# Patient Record
Sex: MALE | Race: BLACK OR AFRICAN AMERICAN | NOT HISPANIC OR LATINO | Employment: FULL TIME | URBAN - METROPOLITAN AREA
[De-identification: names, ages, dates, MRNs, and addresses within clinical notes are randomized per-mention and may not be internally consistent; named-entity substitution may affect disease eponyms.]

---

## 2018-04-06 ENCOUNTER — OFFICE VISIT (OUTPATIENT)
Dept: UROLOGY | Facility: CLINIC | Age: 33
End: 2018-04-06
Payer: COMMERCIAL

## 2018-04-06 VITALS — SYSTOLIC BLOOD PRESSURE: 135 MMHG | HEART RATE: 40 BPM | DIASTOLIC BLOOD PRESSURE: 78 MMHG

## 2018-04-06 DIAGNOSIS — R31.9 HEMATURIA, UNSPECIFIED TYPE: ICD-10-CM

## 2018-04-06 DIAGNOSIS — D66 HEMOPHILIA: ICD-10-CM

## 2018-04-06 DIAGNOSIS — R31.0 GROSS HEMATURIA: Primary | ICD-10-CM

## 2018-04-06 LAB
BILIRUB SERPL-MCNC: ABNORMAL MG/DL
BLOOD URINE, POC: ABNORMAL
COLOR, POC UA: ABNORMAL
GLUCOSE UR QL STRIP: ABNORMAL
KETONES UR QL STRIP: ABNORMAL
LEUKOCYTE ESTERASE URINE, POC: ABNORMAL
NITRITE, POC UA: ABNORMAL
PH, POC UA: 6
PROTEIN, POC: ABNORMAL
SPECIFIC GRAVITY, POC UA: 1.01
UROBILINOGEN, POC UA: 4

## 2018-04-06 PROCEDURE — 87086 URINE CULTURE/COLONY COUNT: CPT

## 2018-04-06 PROCEDURE — 99204 OFFICE O/P NEW MOD 45 MIN: CPT | Mod: 25,S$GLB,, | Performed by: UROLOGY

## 2018-04-06 PROCEDURE — 81002 URINALYSIS NONAUTO W/O SCOPE: CPT | Mod: S$GLB,,, | Performed by: UROLOGY

## 2018-04-06 PROCEDURE — 99999 PR PBB SHADOW E&M-NEW PATIENT-LVL III: CPT | Mod: PBBFAC,,, | Performed by: UROLOGY

## 2018-04-06 RX ORDER — ANTIHEMOPHILIC FACTOR (RECOMBINANT) 2000 (+/-)
KIT INTRAVENOUS
Refills: 1 | COMMUNITY
Start: 2018-02-15

## 2018-04-06 NOTE — PROGRESS NOTES
HPI:  Renny Collazo is a 32 y.o. year old male that  presents with No chief complaint on file.  .  This patient refers himself to the office with proximal weeklong history of intermittent gross hematuria not associated with flank pain nausea vomiting dysuria fevers or chills.  There is no history of kidney stone.    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    His PCP and hematologist are at [facility] however due to availability, he comes for urology evaluation here.  Patient states that he was treated for STD recently but he was told that test came back negative    There is no family history of kidney bladder or prostate cancer and the patient is never had urologic surgery    As patient is new to the [facility] system, there is nothing in the chart      History reviewed. No pertinent past medical history.  Social History     Social History    Marital status: Single     Spouse name: N/A    Number of children: N/A    Years of education: N/A     Occupational History    Not on file.     Social History Main Topics    Smoking status: Current Every Day Smoker    Smokeless tobacco: Never Used    Alcohol use Yes    Drug use: Yes     Types: Marijuana, Cocaine    Sexual activity: Yes     Birth control/ protection: Condom     Other Topics Concern    Not on file     Social History Narrative    No narrative on file     History reviewed. No pertinent surgical history.  History reviewed. No pertinent family history.        Review of Systems  The patient has no chest pains.  The patient has no shortness of breath  Patient wears        glasses.  All other review of systems are negative.      Physical Exam:  /78 (BP Location: Right arm, Patient Position: Sitting)   Pulse (!) 40   General appearance: alert, cooperative, no distress  Constitutional:Oriented to person, place, and time.appears well-developed and  well-nourished.   HEENT: Normocephalic, atraumatic, neck symmetrical, no nasal discharge   Eyes: conjunctivae/corneas clear, PERRL, EOM's intact  Lungs: clear to auscultation bilaterally, no dullness to percussion bilaterally  Heart: regular rate and rhythm without rub; no displacement of the PMI   Abdomen: soft, non-tender; bowel sounds normoactive; no organomegaly  :Penis/perineum without lesions, scrotum without rash/cysts, epididymis nontender bilaterally, urethral meatus in normal location normal size, no penile plaques palpated testes equal in size without masses.  Extremities: extremities symmetric; no clubbing, cyanosis, or edema  Integument: Skin color, texture, turgor normal; no rashes; hair distrubution normal  Neurologic: Alert and oriented X 3, normal strength, normal coordination and gait  Psychiatric: no pressured speech; normal affect; no evidence of impaired cognition     LABS:    Complete Blood Count  No results found for: RBC, HGB, HCT, MCV, MCH, MCHC, RDW, PLT, MPV, GRAN, LYMPH, MONO, EOS, BASO, GRAN, LYMPH, MONO, EOSINOPHIL, BASOPHIL, DIFFMETHOD    Comprehensive Metabolic Panel  No results found for: GLU, BUN, CREATININE, NA, K, CL, PROT, ALBUMIN, BILITOT, AST, ALKPHOS, CO2, ALT, ANIONGAP, EGFRNONAA, ESTGFRAFRICA    PSA  No results found for: PSA      Assessment:    ICD-10-CM ICD-9-CM    1. Gross hematuria R31.0 599.71 Urine culture      Basic metabolic panel      Cystoscopy      POCT URINE DIPSTICK WITHOUT MICROSCOPE   2. Hematuria, unspecified type R31.9 599.70 CT Urogram Abd Pelvis W WO      POCT URINE DIPSTICK WITHOUT MICROSCOPE   3. Hemophilia D66 286.0 POCT URINE DIPSTICK WITHOUT MICROSCOPE     The primary encounter diagnosis was Gross hematuria. Diagnoses of Hematuria, unspecified type and Hemophilia were also pertinent to this visit.      Plan: 1 and 2.  Gross hematuria.  Plan.Plan.  I discussed at length in detail with the patient the need to evaluate blood in the urine.  I discussed  that this is being done to rule out the presence of urothelial cancer.  I discussed the need for both imaging of the upper tracts and also cystoscopy.  Patient voices understanding and agrees.  We'll check baseline metabolic panel and proceed with CT urogram and subsequent cystoscopy.Patient's urine will be cultured and once results are available ,we will contact the patient if antibiotics are indicated.    #3.  Hemophilia.  Plan.  Continue self treatment with anti-hemolytic factor under supervision of his hematologist  Orders Placed This Encounter   Procedures    Cystoscopy    Urine culture    CT Urogram Abd Pelvis W WO    Basic metabolic panel    POCT URINE DIPSTICK WITHOUT MICROSCOPE           Leighton Robbins MD    PLEASE NOTE:  Please be advised that portions of this note were dictated using voice recognition software and may contain dictation related errors in spelling/grammar/appropriate pronouns/syntax or other errors that might have not been found and or corrected on text review.

## 2018-04-08 LAB — BACTERIA UR CULT: NO GROWTH

## 2018-04-10 ENCOUNTER — TELEPHONE (OUTPATIENT)
Dept: UROLOGY | Facility: CLINIC | Age: 33
End: 2018-04-10

## 2018-04-10 NOTE — TELEPHONE ENCOUNTER
----- Message from Leighton Robbins MD sent at 4/9/2018  3:55 PM CDT -----  Please contact patient and let patient know that recent urine culture was negative.

## 2018-04-18 ENCOUNTER — TELEPHONE (OUTPATIENT)
Dept: UROLOGY | Facility: CLINIC | Age: 33
End: 2018-04-18

## 2018-04-18 NOTE — TELEPHONE ENCOUNTER
Please send the patient a registered letter, with return receipt requested, concerning the patient's missed appointment for    evaluation of blood in urine                                                                              .  Please have this letter state that it is important that this evaluation be done to make sure that cancer  of the     urinary tract does not develop                                     .  Please have the patient contact the office if  the patient wants to reschedule this appointment.

## 2019-04-26 LAB
BILIRUB UR QL STRIP: NEGATIVE
CLARITY UR: CLEAR
COLOR UR: YELLOW
GLUCOSE UR QL STRIP: NEGATIVE
HGB UR QL STRIP: NEGATIVE
KETONES UR QL STRIP: ABNORMAL
LEUKOCYTE ESTERASE UR QL STRIP: NEGATIVE
NITRITE UR QL STRIP: NEGATIVE
PH UR STRIP: 8 [PH] (ref 5–8)
PROT UR QL STRIP: NEGATIVE
SP GR UR STRIP: 1.02 (ref 1–1.03)
URN SPEC COLLECT METH UR: ABNORMAL
UROBILINOGEN UR STRIP-ACNC: NEGATIVE EU/DL

## 2019-04-26 PROCEDURE — 81003 URINALYSIS AUTO W/O SCOPE: CPT

## 2019-04-26 PROCEDURE — 96366 THER/PROPH/DIAG IV INF ADDON: CPT

## 2019-04-26 PROCEDURE — 96365 THER/PROPH/DIAG IV INF INIT: CPT

## 2019-04-26 PROCEDURE — 99285 EMERGENCY DEPT VISIT HI MDM: CPT | Mod: 25

## 2019-04-27 ENCOUNTER — HOSPITAL ENCOUNTER (EMERGENCY)
Facility: OTHER | Age: 34
Discharge: HOME | End: 2019-04-27
Attending: EMERGENCY MEDICINE

## 2019-04-27 VITALS
RESPIRATION RATE: 18 BRPM | OXYGEN SATURATION: 98 % | WEIGHT: 149.94 LBS | HEART RATE: 52 BPM | HEIGHT: 68 IN | DIASTOLIC BLOOD PRESSURE: 91 MMHG | SYSTOLIC BLOOD PRESSURE: 162 MMHG | BODY MASS INDEX: 22.72 KG/M2 | TEMPERATURE: 98 F

## 2019-04-27 DIAGNOSIS — R10.13 EPIGASTRIC PAIN: Primary | ICD-10-CM

## 2019-04-27 LAB
ALBUMIN SERPL BCP-MCNC: 4.6 G/DL (ref 3.5–5.2)
ALP SERPL-CCNC: 102 U/L (ref 55–135)
ALT SERPL W/O P-5'-P-CCNC: 70 U/L (ref 10–44)
ANION GAP SERPL CALC-SCNC: 12 MMOL/L (ref 8–16)
AST SERPL-CCNC: 59 U/L (ref 10–40)
BASOPHILS # BLD AUTO: 0.01 K/UL (ref 0–0.2)
BASOPHILS NFR BLD: 0.1 % (ref 0–1.9)
BILIRUB SERPL-MCNC: 0.6 MG/DL (ref 0.1–1)
BUN SERPL-MCNC: 11 MG/DL (ref 6–20)
CALCIUM SERPL-MCNC: 10.6 MG/DL (ref 8.7–10.5)
CHLORIDE SERPL-SCNC: 101 MMOL/L (ref 95–110)
CO2 SERPL-SCNC: 24 MMOL/L (ref 23–29)
CREAT SERPL-MCNC: 0.9 MG/DL (ref 0.5–1.4)
DIFFERENTIAL METHOD: ABNORMAL
EOSINOPHIL # BLD AUTO: 0 K/UL (ref 0–0.5)
EOSINOPHIL NFR BLD: 0 % (ref 0–8)
ERYTHROCYTE [DISTWIDTH] IN BLOOD BY AUTOMATED COUNT: 13.5 % (ref 11.5–14.5)
EST. GFR  (AFRICAN AMERICAN): >60 ML/MIN/1.73 M^2
EST. GFR  (NON AFRICAN AMERICAN): >60 ML/MIN/1.73 M^2
GLUCOSE SERPL-MCNC: 131 MG/DL (ref 70–110)
HCT VFR BLD AUTO: 46.4 % (ref 40–54)
HGB BLD-MCNC: 16.3 G/DL (ref 14–18)
LIPASE SERPL-CCNC: 28 U/L (ref 4–60)
LYMPHOCYTES # BLD AUTO: 1.6 K/UL (ref 1–4.8)
LYMPHOCYTES NFR BLD: 15.4 % (ref 18–48)
MCH RBC QN AUTO: 32.4 PG (ref 27–31)
MCHC RBC AUTO-ENTMCNC: 35.1 G/DL (ref 32–36)
MCV RBC AUTO: 92 FL (ref 82–98)
MONOCYTES # BLD AUTO: 0.3 K/UL (ref 0.3–1)
MONOCYTES NFR BLD: 3.2 % (ref 4–15)
NEUTROPHILS # BLD AUTO: 8.5 K/UL (ref 1.8–7.7)
NEUTROPHILS NFR BLD: 81.1 % (ref 38–73)
PLATELET # BLD AUTO: 179 K/UL (ref 150–350)
PMV BLD AUTO: 9.2 FL (ref 9.2–12.9)
POTASSIUM SERPL-SCNC: 4.6 MMOL/L (ref 3.5–5.1)
PROT SERPL-MCNC: 8.9 G/DL (ref 6–8.4)
RBC # BLD AUTO: 5.03 M/UL (ref 4.6–6.2)
SODIUM SERPL-SCNC: 137 MMOL/L (ref 136–145)
WBC # BLD AUTO: 10.53 K/UL (ref 3.9–12.7)

## 2019-04-27 PROCEDURE — 63600175 PHARM REV CODE 636 W HCPCS: Performed by: EMERGENCY MEDICINE

## 2019-04-27 PROCEDURE — 36415 COLL VENOUS BLD VENIPUNCTURE: CPT

## 2019-04-27 PROCEDURE — 85025 COMPLETE CBC W/AUTO DIFF WBC: CPT

## 2019-04-27 PROCEDURE — 25000003 PHARM REV CODE 250: Performed by: EMERGENCY MEDICINE

## 2019-04-27 PROCEDURE — 83690 ASSAY OF LIPASE: CPT

## 2019-04-27 PROCEDURE — 25500020 PHARM REV CODE 255: Performed by: EMERGENCY MEDICINE

## 2019-04-27 PROCEDURE — 80053 COMPREHEN METABOLIC PANEL: CPT

## 2019-04-27 RX ORDER — DICYCLOMINE HYDROCHLORIDE 20 MG/1
20 TABLET ORAL 2 TIMES DAILY
Qty: 20 TABLET | Refills: 0 | Status: SHIPPED | OUTPATIENT
Start: 2019-04-27 | End: 2019-05-27

## 2019-04-27 RX ORDER — ONDANSETRON 4 MG/1
4 TABLET, ORALLY DISINTEGRATING ORAL
Status: COMPLETED | OUTPATIENT
Start: 2019-04-27 | End: 2019-04-27

## 2019-04-27 RX ORDER — OMEPRAZOLE 20 MG/1
20 CAPSULE, DELAYED RELEASE ORAL DAILY
Qty: 30 CAPSULE | Refills: 0 | Status: SHIPPED | OUTPATIENT
Start: 2019-04-27 | End: 2019-05-27

## 2019-04-27 RX ORDER — SODIUM CHLORIDE 9 MG/ML
1000 INJECTION, SOLUTION INTRAVENOUS
Status: COMPLETED | OUTPATIENT
Start: 2019-04-27 | End: 2019-04-27

## 2019-04-27 RX ORDER — ONDANSETRON 4 MG/1
4 TABLET, ORALLY DISINTEGRATING ORAL EVERY 8 HOURS PRN
Qty: 20 TABLET | Refills: 0 | Status: SHIPPED | OUTPATIENT
Start: 2019-04-27 | End: 2019-05-04

## 2019-04-27 RX ADMIN — PROMETHAZINE HYDROCHLORIDE 12.5 MG: 25 INJECTION INTRAMUSCULAR; INTRAVENOUS at 01:04

## 2019-04-27 RX ADMIN — SODIUM CHLORIDE 1000 ML: 0.9 INJECTION, SOLUTION INTRAVENOUS at 01:04

## 2019-04-27 RX ADMIN — LIDOCAINE HYDROCHLORIDE: 20 SOLUTION ORAL; TOPICAL at 01:04

## 2019-04-27 RX ADMIN — ONDANSETRON 4 MG: 4 TABLET, ORALLY DISINTEGRATING ORAL at 02:04

## 2019-04-27 RX ADMIN — IOHEXOL 75 ML: 350 INJECTION, SOLUTION INTRAVENOUS at 03:04

## 2019-04-27 NOTE — ED NOTES
Pt wanting to know how much longer. Told pt that there is one ahead of him and we have no rooms available, but as soon as we have a discharge or admit, that they will be second to come back

## 2019-04-27 NOTE — ED PROVIDER NOTES
"Encounter Date: 4/26/2019    SCRIBE #1 NOTE: I, Aurelio Calvert, am scribing for, and in the presence of, Dr. Cates .       History     Chief Complaint   Patient presents with    Flank Pain     L flank pain since 1800 hrs, radiating to the epigastric area w/ nausea     Time seen by provider: 1:15 AM    This is a 33 y.o. male who presents with complaint of constant, "sharp", epigastric, abdominal pain radiating to his left side of his back that began at 6 pm yesterday. Patient states the pain began while he was eating "candied jose and a sandwich". He currently rates his pain an "8" out of 10. He has not taken analgesics. He reports associated chills, hot flashes, and nausea. He admits to consuming small amount of alcohol yesterday. He denies fevers, headaches, dizziness, vomiting, diarrhea, bloody stools, or dysuria.       The history is provided by the patient (friend at bedside).     Review of patient's allergies indicates:   Allergen Reactions    Aspirin Other (See Comments)     Pt is a hemophiliac    Claritin [loratadine]      Past Medical History:   Diagnosis Date    Hemophilia A      History reviewed. No pertinent surgical history.  History reviewed. No pertinent family history.  Social History     Tobacco Use    Smoking status: Current Every Day Smoker    Smokeless tobacco: Never Used   Substance Use Topics    Alcohol use: Yes    Drug use: Yes     Types: Marijuana, Cocaine     Review of Systems   Constitutional: Positive for chills. Negative for fever.        Positive for hot flashes.   HENT: Negative for congestion, rhinorrhea and sore throat.    Respiratory: Negative for cough and shortness of breath.    Cardiovascular: Negative for chest pain.   Gastrointestinal: Positive for abdominal pain and nausea. Negative for blood in stool, diarrhea and vomiting.   Genitourinary: Negative for dysuria.   Musculoskeletal: Negative for back pain.   Skin: Negative for rash.   Neurological: Negative for " dizziness and headaches.   Psychiatric/Behavioral: Negative for confusion.       Physical Exam     Initial Vitals [04/26/19 2312]   BP Pulse Resp Temp SpO2   136/87 (!) 50 20 97.9 °F (36.6 °C) 100 %      MAP       --         Physical Exam    Nursing note and vitals reviewed.  Constitutional: He appears well-developed and well-nourished. No distress.   HENT:   Head: Normocephalic and atraumatic.   Mouth/Throat: Oropharynx is clear and moist.   Eyes: Conjunctivae and EOM are normal. Pupils are equal, round, and reactive to light.   Neck: Normal range of motion. Neck supple.   Cardiovascular: Regular rhythm, normal heart sounds and intact distal pulses. Bradycardia present.    Pulmonary/Chest: Breath sounds normal. No respiratory distress. He has no wheezes. He has no rhonchi. He has no rales.   Abdominal: Soft. He exhibits no distension. There is tenderness. There is guarding. There is no rebound.   Epigastric tenderness to palpation with voluntary guarding. No rebound. Non peritoneal.    Musculoskeletal: Normal range of motion. He exhibits no edema or tenderness.   No CVA tenderness.    Neurological: He is alert and oriented to person, place, and time. He has normal strength.   Skin: Skin is warm and dry.   Psychiatric: He has a normal mood and affect. His behavior is normal. Judgment and thought content normal.         ED Course   Procedures  Labs Reviewed   URINALYSIS, REFLEX TO URINE CULTURE - Abnormal; Notable for the following components:       Result Value    Ketones, UA 1+ (*)     All other components within normal limits    Narrative:     Preferred Collection Type->Urine, Clean Catch   CBC W/ AUTO DIFFERENTIAL - Abnormal; Notable for the following components:    MCH 32.4 (*)     Gran # (ANC) 8.5 (*)     Gran% 81.1 (*)     Lymph% 15.4 (*)     Mono% 3.2 (*)     All other components within normal limits   COMPREHENSIVE METABOLIC PANEL - Abnormal; Notable for the following components:    Glucose 131 (*)      Calcium 10.6 (*)     Total Protein 8.9 (*)     AST 59 (*)     ALT 70 (*)     All other components within normal limits   LIPASE          Imaging Results          CT Abdomen Pelvis With Contrast (Final result)  Result time 04/27/19 03:43:17    Final result by Hiram Christianson MD (04/27/19 03:43:17)                 Impression:      There is periportal edema seen with distention of the IVC suggesting systemic hypervolemia which can be seen following rapid IV fluid bolus.    No calcified gallstones.  Small amount pericholecystic fluid noted, also possibly related to systemic hypervolemia.    Some fluid-filled small bowel loops with mild diffuse wall thickening present suggesting possible enteritis.      Electronically signed by: Hiram Christianson MD  Date:    04/27/2019  Time:    03:43             Narrative:    EXAMINATION:  CT ABDOMEN PELVIS WITH CONTRAST    CLINICAL HISTORY:  Nausea, vomiting, diarrhea;    TECHNIQUE:  Low dose axial images, sagittal and coronal reformations were obtained from the lung bases to the pubic symphysis following the IV administration of 75 mL of Omnipaque 350 .  Oral contrast was not administered.    COMPARISON:  None.    FINDINGS:  Abdomen:    - Lower thorax:Unremarkable.    - Lung bases: No infiltrates and no nodules.    - Liver: Normal in size with no focal masses.  There is periportal edema seen with distention of the IVC suggesting systemic hypervolemia which can be seen following rapid IV fluid bolus.    - Gallbladder: No calcified gallstones.  Small amount pericholecystic fluid noted, also possibly related to systemic hypervolemia.    - Bile Ducts: No evidence of intra or extra hepatic biliary ductal dilation.    - Spleen: Negative.    - Kidneys: No mass or hydronephrosis.    - Adrenals: Unremarkable.    - Pancreas: No mass or peripancreatic fat stranding.    - Retroperitoneum:  No significant adenopathy.    - Vascular: No abdominal aortic aneurysm.    - Abdominal wall:   Unremarkable.    Pelvis:    No pelvic mass, adenopathy, or free fluid.    Bowel/Mesentery:    No bowel obstruction.  Some fluid-filled small bowel loops with mild diffuse wall thickening present.  Normal appendix.    Bones:  No acute osseous abnormality and no suspicious lytic or blastic lesion.                                 Medical Decision Making:   Clinical Tests:   Lab Tests: Ordered and Reviewed  ED Management:  33-year-old male presents with epigastric pain.  Started about 6 hr ago after food.  My examination concern of gastritis/peptic ulcer disease.  No right upper quadrant tenderness to suggest gallbladder disease.  Will give a GI cocktail and re-evaluate.    1:30 a.m. after the GI cocktail the patient vomited.  Will get labs give antiemetic and further evaluate.    2:25 a.m. blood work reviewed showing no acute abnormalities.  Patient is still nauseated.  Fluids being administered.  Will get a CT scan to rule out any other acute abnormality.  Updated the patient answered all questions at this time.    4:00 a.m. patient is resting comfortably.  CT scan shows no acute abnormality.  Does show some periportal edema as well as some edema around the gallbladder however this likely secondary to fluid bolus.  I re-evaluated his abdomen is a negative Bennett sign. No tenderness to palpation the right upper quadrant.  I suspect this is viral etiology or gastritis.  I did offer getting an ultrasound to further evaluate the gallbladder with the patient stated he did not necessarily want this.  I feel this is reasonable at this time with the patient's vital signs being normal and no other significant abnormalities on the blood work.  I recommend alternative of re-evaluation in 24 hr of no in significant improvement.  With shared medical decision making the patient as well as the patient's friend who is at bedside agreed with this plan of care.  Will discharge with analgesia and antiemetic.    Patient discharged home  in stable condition. Diagnosis and treatment plan explained to patient. I have answered all questions and the patient is satisfied with the plan of care. The patient demonstrates understanding of the care plan. This is the extent to the patients complaints today here in the emergency department.            Scribe Attestation:   Scribe #1: I performed the above scribed service and the documentation accurately describes the services I performed. I attest to the accuracy of the note.    Attending Attestation:           Physician Attestation for Scribe:  Physician Attestation Statement for Scribe #1: I, Dr. Cates, reviewed documentation, as scribed by Aurelio Calvert  in my presence, and it is both accurate and complete.                    Clinical Impression:     1. Epigastric pain                                   Edu Cates, DO  04/27/19 0402

## 2019-04-27 NOTE — ED TRIAGE NOTES
"Pt with c/o LUQ pain. Pt states it feels like "a pressure" but started out as a sharp pain. Pt states discomfort started after he ate lunch yesterday. Pt reports nausea but denies vomiting. Pt denies diarrhea or cp. Pt reports subjective fever and dizziness. NAD noted. Resp even and unlabored. Pt states "I feel hot then cold and I feel restless"  "

## 2019-04-27 NOTE — ED NOTES
Rec'd report from OFE Yang RN. Pt lying in bed, resting w/ eyes closed and is easily arousable. No respriatory distress observed. Skin is warm and dry w/ pink mucosa. VSS. Will continue to monitor closely.

## 2021-02-24 ENCOUNTER — OFFICE VISIT (OUTPATIENT)
Dept: URGENT CARE | Facility: CLINIC | Age: 36
End: 2021-02-24
Payer: COMMERCIAL

## 2021-02-24 VITALS
HEART RATE: 70 BPM | RESPIRATION RATE: 16 BRPM | BODY MASS INDEX: 22.58 KG/M2 | HEIGHT: 68 IN | SYSTOLIC BLOOD PRESSURE: 111 MMHG | OXYGEN SATURATION: 98 % | WEIGHT: 149 LBS | TEMPERATURE: 99 F | DIASTOLIC BLOOD PRESSURE: 73 MMHG

## 2021-02-24 DIAGNOSIS — R59.0 LYMPHADENOPATHY, AXILLARY: Primary | ICD-10-CM

## 2021-02-24 PROCEDURE — 99203 OFFICE O/P NEW LOW 30 MIN: CPT | Mod: S$GLB,,, | Performed by: NURSE PRACTITIONER

## 2021-02-24 PROCEDURE — 99203 PR OFFICE/OUTPT VISIT, NEW, LEVL III, 30-44 MIN: ICD-10-PCS | Mod: S$GLB,,, | Performed by: NURSE PRACTITIONER

## 2021-02-24 RX ORDER — DOXYCYCLINE 100 MG/1
100 CAPSULE ORAL EVERY 12 HOURS
Qty: 14 CAPSULE | Refills: 0 | Status: SHIPPED | OUTPATIENT
Start: 2021-02-24 | End: 2021-03-03

## 2023-02-05 ENCOUNTER — HOSPITAL ENCOUNTER (EMERGENCY)
Facility: OTHER | Age: 38
Discharge: HOME | End: 2023-02-05
Attending: EMERGENCY MEDICINE
Payer: COMMERCIAL

## 2023-02-05 VITALS
DIASTOLIC BLOOD PRESSURE: 84 MMHG | WEIGHT: 155 LBS | RESPIRATION RATE: 18 BRPM | HEIGHT: 68 IN | HEART RATE: 80 BPM | BODY MASS INDEX: 23.49 KG/M2 | OXYGEN SATURATION: 100 % | SYSTOLIC BLOOD PRESSURE: 139 MMHG | TEMPERATURE: 98 F

## 2023-02-05 DIAGNOSIS — D66 HEMOPHILIA A: ICD-10-CM

## 2023-02-05 DIAGNOSIS — R31.9 HEMATURIA, UNSPECIFIED TYPE: Primary | ICD-10-CM

## 2023-02-05 LAB
ABO + RH BLD: NORMAL
ALBUMIN SERPL BCP-MCNC: 3.8 G/DL (ref 3.5–5.2)
ALP SERPL-CCNC: 87 U/L (ref 55–135)
ALT SERPL W/O P-5'-P-CCNC: 102 U/L (ref 10–44)
ANION GAP SERPL CALC-SCNC: 5 MMOL/L (ref 8–16)
AST SERPL-CCNC: 49 U/L (ref 10–40)
BACTERIA #/AREA URNS HPF: ABNORMAL /HPF
BASOPHILS # BLD AUTO: 0.02 K/UL (ref 0–0.2)
BASOPHILS NFR BLD: 0.3 % (ref 0–1.9)
BILIRUB SERPL-MCNC: 0.3 MG/DL (ref 0.1–1)
BILIRUB UR QL STRIP: NEGATIVE
BLD GP AB SCN CELLS X3 SERPL QL: NORMAL
BUN SERPL-MCNC: 13 MG/DL (ref 6–20)
CALCIUM SERPL-MCNC: 9.3 MG/DL (ref 8.7–10.5)
CHLORIDE SERPL-SCNC: 108 MMOL/L (ref 95–110)
CLARITY UR: ABNORMAL
CO2 SERPL-SCNC: 26 MMOL/L (ref 23–29)
COLOR UR: ABNORMAL
CREAT SERPL-MCNC: 0.9 MG/DL (ref 0.5–1.4)
DIFFERENTIAL METHOD: ABNORMAL
EOSINOPHIL # BLD AUTO: 0.1 K/UL (ref 0–0.5)
EOSINOPHIL NFR BLD: 2 % (ref 0–8)
ERYTHROCYTE [DISTWIDTH] IN BLOOD BY AUTOMATED COUNT: 13.1 % (ref 11.5–14.5)
EST. GFR  (NO RACE VARIABLE): >60 ML/MIN/1.73 M^2
GLUCOSE SERPL-MCNC: 97 MG/DL (ref 70–110)
GLUCOSE UR QL STRIP: NEGATIVE
HCT VFR BLD AUTO: 38.6 % (ref 40–54)
HGB BLD-MCNC: 13.2 G/DL (ref 14–18)
HGB UR QL STRIP: ABNORMAL
HYALINE CASTS #/AREA URNS LPF: 0 /LPF
IMM GRANULOCYTES # BLD AUTO: 0.03 K/UL (ref 0–0.04)
IMM GRANULOCYTES NFR BLD AUTO: 0.5 % (ref 0–0.5)
INR PPP: 1 (ref 0.8–1.2)
KETONES UR QL STRIP: ABNORMAL
LEUKOCYTE ESTERASE UR QL STRIP: NEGATIVE
LYMPHOCYTES # BLD AUTO: 2.8 K/UL (ref 1–4.8)
LYMPHOCYTES NFR BLD: 46 % (ref 18–48)
MCH RBC QN AUTO: 31.7 PG (ref 27–31)
MCHC RBC AUTO-ENTMCNC: 34.2 G/DL (ref 32–36)
MCV RBC AUTO: 93 FL (ref 82–98)
MICROSCOPIC COMMENT: ABNORMAL
MONOCYTES # BLD AUTO: 0.6 K/UL (ref 0.3–1)
MONOCYTES NFR BLD: 9.5 % (ref 4–15)
NEUTROPHILS # BLD AUTO: 2.5 K/UL (ref 1.8–7.7)
NEUTROPHILS NFR BLD: 41.7 % (ref 38–73)
NITRITE UR QL STRIP: NEGATIVE
NRBC BLD-RTO: 0 /100 WBC
PH UR STRIP: 6 [PH] (ref 5–8)
PLATELET # BLD AUTO: 156 K/UL (ref 150–450)
PMV BLD AUTO: 9.3 FL (ref 9.2–12.9)
POTASSIUM SERPL-SCNC: 3.8 MMOL/L (ref 3.5–5.1)
PROT SERPL-MCNC: 6.9 G/DL (ref 6–8.4)
PROT UR QL STRIP: ABNORMAL
PROTHROMBIN TIME: 10.6 SEC (ref 9–12.5)
RBC # BLD AUTO: 4.17 M/UL (ref 4.6–6.2)
RBC #/AREA URNS HPF: >100 /HPF (ref 0–4)
SODIUM SERPL-SCNC: 139 MMOL/L (ref 136–145)
SP GR UR STRIP: 1.02 (ref 1–1.03)
SQUAMOUS #/AREA URNS HPF: 2 /HPF
URN SPEC COLLECT METH UR: ABNORMAL
UROBILINOGEN UR STRIP-ACNC: 1 EU/DL
WBC # BLD AUTO: 6.02 K/UL (ref 3.9–12.7)
WBC #/AREA URNS HPF: 12 /HPF (ref 0–5)

## 2023-02-05 PROCEDURE — 81000 URINALYSIS NONAUTO W/SCOPE: CPT | Performed by: EMERGENCY MEDICINE

## 2023-02-05 PROCEDURE — 99285 EMERGENCY DEPT VISIT HI MDM: CPT | Mod: 25

## 2023-02-05 PROCEDURE — 85025 COMPLETE CBC W/AUTO DIFF WBC: CPT | Performed by: EMERGENCY MEDICINE

## 2023-02-05 PROCEDURE — 85610 PROTHROMBIN TIME: CPT | Performed by: EMERGENCY MEDICINE

## 2023-02-05 PROCEDURE — 80053 COMPREHEN METABOLIC PANEL: CPT | Performed by: EMERGENCY MEDICINE

## 2023-02-05 PROCEDURE — 96361 HYDRATE IV INFUSION ADD-ON: CPT

## 2023-02-05 PROCEDURE — 25000003 PHARM REV CODE 250: Performed by: EMERGENCY MEDICINE

## 2023-02-05 PROCEDURE — 96360 HYDRATION IV INFUSION INIT: CPT

## 2023-02-05 PROCEDURE — 86900 BLOOD TYPING SEROLOGIC ABO: CPT | Performed by: EMERGENCY MEDICINE

## 2023-02-05 PROCEDURE — 87086 URINE CULTURE/COLONY COUNT: CPT | Performed by: EMERGENCY MEDICINE

## 2023-02-05 RX ADMIN — SODIUM CHLORIDE 1000 ML: 9 INJECTION, SOLUTION INTRAVENOUS at 06:02

## 2023-02-05 RX ADMIN — SODIUM CHLORIDE 1000 ML: 9 INJECTION, SOLUTION INTRAVENOUS at 02:02

## 2023-02-05 NOTE — Clinical Note
"Renny "Renny" Vale was seen and treated in our emergency department on 2/5/2023.  He may return to work on 02/07/2023.  May return to work Monday if symptoms have improved     If you have any questions or concerns, please don't hesitate to call.      Dickson Barker RN    "

## 2023-02-05 NOTE — ED PROVIDER NOTES
"Encounter Date: 2/5/2023    SCRIBE #1 NOTE: I, Ameena Linares, am scribing for, and in the presence of,  Richy Vo MD. I have scribed the following portions of the note - Other sections scribed: HPI, ROS, PE.     History     Chief Complaint   Patient presents with    Hematuria     Pt has been having hematuria for the past 3 days - pt has hemophilia and was directed by treatment center/coordinator to go to [facility] for treatment      Renny Collazo is a 37 y.o. male, with a PMHx of Hemophilia A, who presents to the ED for evaluation of hematuria onset 3 days ago. He presented to [facility] yesterday but left before being seen due to a long wait and was advised to go to another ED. He states he has noticed pink urine but denies frequency, difficulty voiding, or dysuria.  Reports occasional "random" episodes of lower abdominal pain that he does not believe to be associated with urination. No bloody stools. No recent traumas, injuries, heavy lifting, or other changes in activity. He reports a history of previous similar symptoms once as a child after taking Claritin and once in his 20s which resolved on its own after 1 day, although he reports his current symptoms are more severe than this last episode. His last dose of factor 8 was 1/27/23, stating he was advised to skip his planned dose for 2/3/23 due to his symptoms. Denies changes in P/O intake and he has been able to self hydrate. No recent ASA or ibuprofen. Admits to recent EtOH consumption, most recently several days ago. Denies fever, chills, pain elsewhere, and other somatic complaints. This is the extent of the patient's complaints at this time.    The history is provided by the patient.   Review of patient's allergies indicates:   Allergen Reactions    Aspirin Other (See Comments)     Pt is a hemophiliac    Claritin [loratadine]      Past Medical History:   Diagnosis Date    Hemophilia A      No past surgical history on file.  No family history on file.  Social " History     Tobacco Use    Smoking status: Every Day    Smokeless tobacco: Never   Substance Use Topics    Alcohol use: Yes    Drug use: Yes     Types: Marijuana, Cocaine     Review of Systems   Constitutional:  Negative for fever.   HENT:  Negative for congestion.    Eyes:  Negative for redness.   Respiratory:  Negative for shortness of breath.    Cardiovascular:  Negative for chest pain.   Gastrointestinal:  Positive for abdominal pain. Negative for blood in stool.   Genitourinary:  Positive for hematuria. Negative for difficulty urinating, dysuria and frequency.   Skin:  Negative for rash.   Neurological:  Negative for headaches.   Psychiatric/Behavioral:  Negative for confusion.      Physical Exam     Initial Vitals [02/05/23 0032]   BP Pulse Resp Temp SpO2   128/69 82 16 97.9 °F (36.6 °C) 98 %      MAP       --         Physical Exam    Nursing note and vitals reviewed.  Constitutional: He appears well-developed and well-nourished. He is not diaphoretic. No distress.   HENT:   Head: Normocephalic and atraumatic.   Eyes: Conjunctivae are normal. No scleral icterus.   Neck: Neck supple.   Cardiovascular:  Normal rate, regular rhythm, normal heart sounds and intact distal pulses.           No murmur heard.  Pulmonary/Chest: Breath sounds normal. No respiratory distress. He has no wheezes. He has no rhonchi. He has no rales.   Abdominal: Abdomen is soft. There is no abdominal tenderness. There is no rebound and no guarding.   Musculoskeletal:         General: No edema.      Cervical back: Neck supple.     Neurological: He is alert and oriented to person, place, and time.   Skin: Skin is warm and dry.   Psychiatric: He has a normal mood and affect.       ED Course   Procedures  Labs Reviewed   URINALYSIS, REFLEX TO URINE CULTURE - Abnormal; Notable for the following components:       Result Value    Color, UA Red (*)     Appearance, UA Cloudy (*)     Protein, UA 1+ (*)     Ketones, UA Trace (*)     Occult Blood UA 3+  (*)     All other components within normal limits    Narrative:     Specimen Source->Urine   URINALYSIS MICROSCOPIC - Abnormal; Notable for the following components:    RBC, UA >100 (*)     WBC, UA 12 (*)     Bacteria Few (*)     All other components within normal limits    Narrative:     Specimen Source->Urine   COMPREHENSIVE METABOLIC PANEL - Abnormal; Notable for the following components:    AST 49 (*)      (*)     Anion Gap 5 (*)     All other components within normal limits   CBC W/ AUTO DIFFERENTIAL - Abnormal; Notable for the following components:    RBC 4.17 (*)     Hemoglobin 13.2 (*)     Hematocrit 38.6 (*)     MCH 31.7 (*)     All other components within normal limits   CULTURE, URINE   PROTIME-INR   TYPE & SCREEN          Imaging Results              CT Renal Stone Study ABD Pelvis WO (Final result)  Result time 02/05/23 03:32:28      Final result by Manolo Braun MD (02/05/23 03:32:28)                   Impression:      No CT evidence of acute intra-abdominal abnormality, specifically no evidence of hydronephrosis or nephrolithiasis.      Electronically signed by: Manolo Braun MD  Date:    02/05/2023  Time:    03:32               Narrative:    EXAMINATION:  CT RENAL STONE STUDY ABD PELVIS WO    CLINICAL HISTORY:  Hematuria, hemophilia A;    TECHNIQUE:  Low dose axial images, sagittal and coronal reformations were obtained from the lung bases to the pubic symphysis.  Contrast was not administered.    COMPARISON:  04/27/2019    FINDINGS:  The lung bases are unremarkable.  There is no pleural fluid present.  The visualized portions of the heart appear normal.    The liver is unremarkable in appearance on this limited non-contrast examination.  Suspected contracted gallbladder is present.  No evidence of cholelithiasis.  There is no intra-or extrahepatic biliary ductal dilatation.    The stomach, spleen, pancreas, and adrenal glands are unremarkable.    The kidneys are normal in size and  location. There is no evidence of hydronephrosis or nephrolithiasis.  No definite obstructing ureteral calculus identified.  The urinary bladder is relatively nondistended limiting assessment.  Prostate appears within normal limits.  No significant free fluid in the pelvis.    The abdominal aorta is normal in course and caliber without significant atherosclerotic calcifications.The visualized loops of small and large bowel show no evidence of obstruction or inflammation.  No CT evidence to suggest acute appendicitis.  There is no ascites, portal venous gas, or free intraperitoneal air.  There are few scattered small nonspecific retroperitoneal lymph nodes, similar to prior examination.    When viewed with bone windows the osseous structures are unremarkable.  The extraperitoneal soft tissues are unremarkable.                                       Medications   sodium chloride 0.9% bolus 1,000 mL 1,000 mL (0 mLs Intravenous Stopped 2/5/23 0439)   sodium chloride 0.9% bolus 1,000 mL 1,000 mL (0 mLs Intravenous Stopped 2/5/23 2645)     Medical Decision Making:   History:   Old Medical Records: I decided to obtain old medical records.  Initial Assessment:       37-year-old male with history of hemophilia a presents for evaluation of hematuria for the past 3 days.  Patient denies any associated increased frequency or dysuria or symptoms of obstruction, stating that urine has been pinkish but occasionally more bloody since onset.  He has distant similar episodes that did not require any urological intervention.  He takes weekly factor 8 but did not take it as scheduled yesterday as recommended by his hematologist due to concern for precipitating clots and causing urinary obstruction.  He denies bleeding of any other sites, and no concern for STDs.  He occasionally feels some lower abdominal discomfort but no current abdominal pain, fevers, or other complaints.  On exam patient with normal vitals, well-appearing in no  distress.  His hematologist is at [facility] and was instructed to go there where the hematology fellow would see him, but he came here due to long waits at ER there.  I contacted his hematology nurse coordinator and they recommended starting fluids, getting imaging to rule out stones or other cause, and not giving any factor at this time.  Will start this workup and discuss with hematologist further treatment.    Workup with hemoglobin 13.2, normal renal function, normal INR.  UA with greater than 100 RBC and 12 WBC with few bacteria; lower suspicion for UTI since negative leukocytes and nitrites, and high RBC to WBC ratio.  Patient remained resting comfortably while in ED with no episodes of gross hematuria, normal vitals and no abdominal pain.  CT renal with no sign of stones or other acute abnormality.  I discussed patient's case with his hematologist at [facility] Dr. Randle who is reassured by workup and recommends holding his weekly factor until hematuria resolves, and does prefer 24 hour admission for continued hydration and monitoring.  I discussed this with patient who states that he can not stay for admission today, but he will aggressively p.o. hydrate at home and has time off from work now to rest as recommended.  He was treated with 2 L IVF prior to discharge, and understands need to closely follow-up with his hematologist and return to the ED for any worsening hematuria or other concerns.          Clinical Tests:   Lab Tests: Ordered and Reviewed        Scribe Attestation:   Scribe #1: I performed the above scribed service and the documentation accurately describes the services I performed. I attest to the accuracy of the note.     I, Dr. Richy Vo, personally performed the services described in this documentation. All medical record entries made by the scribe were at my direction and in my presence.  I have reviewed the chart and agree that the record reflects my personal performance and is accurate and  complete. Richy Vo MD.                      Clinical Impression:   Final diagnoses:  [R31.9] Hematuria, unspecified type (Primary)  [D66] Hemophilia A        ED Disposition Condition    Discharge Stable          ED Prescriptions    None       Follow-up Information       Follow up With Specialties Details Why Contact Info    [facility] - Emergency Dept Emergency Medicine Go to  If symptoms worsen [address] [facility] [address]  [phone]             Richy Vo MD  02/05/23 8981

## 2023-02-06 LAB — BACTERIA UR CULT: NO GROWTH
